# Patient Record
Sex: FEMALE | Race: WHITE | NOT HISPANIC OR LATINO
[De-identification: names, ages, dates, MRNs, and addresses within clinical notes are randomized per-mention and may not be internally consistent; named-entity substitution may affect disease eponyms.]

---

## 2017-02-07 ENCOUNTER — APPOINTMENT (OUTPATIENT)
Dept: ORTHOPEDIC SURGERY | Facility: CLINIC | Age: 62
End: 2017-02-07

## 2017-02-07 VITALS — DIASTOLIC BLOOD PRESSURE: 80 MMHG | SYSTOLIC BLOOD PRESSURE: 132 MMHG | HEART RATE: 74 BPM

## 2017-02-07 DIAGNOSIS — M25.641 STIFFNESS OF RIGHT HAND, NOT ELSEWHERE CLASSIFIED: ICD-10-CM

## 2017-02-07 DIAGNOSIS — M65.331 TRIGGER FINGER, RIGHT MIDDLE FINGER: ICD-10-CM

## 2017-02-07 DIAGNOSIS — M25.642 STIFFNESS OF LEFT HAND, NOT ELSEWHERE CLASSIFIED: ICD-10-CM

## 2017-03-16 ENCOUNTER — APPOINTMENT (OUTPATIENT)
Dept: ORTHOPEDIC SURGERY | Facility: CLINIC | Age: 62
End: 2017-03-16

## 2017-03-16 VITALS
HEART RATE: 82 BPM | DIASTOLIC BLOOD PRESSURE: 72 MMHG | WEIGHT: 139 LBS | BODY MASS INDEX: 25.26 KG/M2 | HEIGHT: 62.2 IN | OXYGEN SATURATION: 99 % | SYSTOLIC BLOOD PRESSURE: 112 MMHG

## 2018-12-03 ENCOUNTER — APPOINTMENT (OUTPATIENT)
Dept: GASTROENTEROLOGY | Facility: CLINIC | Age: 63
End: 2018-12-03

## 2019-04-09 ENCOUNTER — APPOINTMENT (OUTPATIENT)
Dept: ENDOCRINOLOGY | Facility: CLINIC | Age: 64
End: 2019-04-09
Payer: MEDICARE

## 2019-04-09 VITALS
SYSTOLIC BLOOD PRESSURE: 120 MMHG | HEART RATE: 61 BPM | DIASTOLIC BLOOD PRESSURE: 70 MMHG | OXYGEN SATURATION: 98 % | BODY MASS INDEX: 23.62 KG/M2 | WEIGHT: 130 LBS

## 2019-04-09 DIAGNOSIS — M81.0 AGE-RELATED OSTEOPOROSIS W/OUT CURRENT PATHOLOGICAL FRACTURE: ICD-10-CM

## 2019-04-09 PROCEDURE — 99204 OFFICE O/P NEW MOD 45 MIN: CPT

## 2019-04-09 RX ORDER — MULTIVIT-MIN/FOLIC/VIT K/LYCOP 400-300MCG
25 MCG TABLET ORAL
Refills: 0 | Status: ACTIVE | COMMUNITY

## 2019-04-09 RX ORDER — CLONAZEPAM 2 MG/1
TABLET ORAL
Refills: 0 | Status: ACTIVE | COMMUNITY

## 2019-04-09 RX ORDER — ELECTROLYTES/DEXTROSE
SOLUTION, ORAL ORAL
Refills: 0 | Status: COMPLETED | COMMUNITY

## 2019-04-09 NOTE — CONSULT LETTER
[Consult Letter:] : I had the pleasure of evaluating your patient, [unfilled]. [Please see my note below.] : Please see my note below. [Consult Closing:] : Thank you very much for allowing me to participate in the care of this patient.  If you have any questions, please do not hesitate to contact me. [Sincerely,] : Sincerely, [Dear  ___] : Dear  [unfilled], [DrJerri  ___] : Dr. REECE [FreeTextEntry2] : Hamlet Pickett MD\par [address], \[place] [address]

## 2019-04-09 NOTE — ASSESSMENT
[Raloxifene Therapy] : Risks and benefits of Raloxifene therapy were discussed with the patient including blood clots, hot flashes, and cramps [Denosumab Therapy] : Risks  and benefits of denosumab therapy were discussed with the patient including eczema, cellulitis, osteonecrosis of the jaw and atypical femur fractures [Bisphosphonate Therapy] : Risks  and benefits of bisphosphonate therapy were  discussed with the patient including gastroesophageal irritation, osteonecrosis of the jaw, and atypical femur fractures, and acute phase reaction [FreeTextEntry1] : 63 year old female with osteoporosis. \par \par Pt has severe osteoporosis for many years with T score decreased to ~ 4.5. Pt had been on increasing doses of Omeprazole (PPI) for active GERD now on Zantac, though I do not believe this is the root cause of her bone loss. She was only first treated by Dr. Mak in 2017 with 1 year of Forteo. Pt was then transitioned to Atelvia which she did not tolerate due to UGI sx. She then took IV Reclast in Nov 2018. Tolerated well. No APR. No ONJ or thigh pain. No interval fx. Pt gets dietary CA. Pt takes Vit D. Pt taking Vit K. I discussed risk of thrombosis, I generally don't recommend this supplement. Pt exercises regularly and golfs. Do not restrict activity but reviewed proper form to bend and lift. \par \par Risks and benefits discussed to include flu like sx on first dose of Reclast, long term risk of ONJ or atypical femur fx. With Reclast, pt would be candidate for a drug holiday <5 years to decrease the future risk of these side effects. Recent research with Reclast looked at 18 month cycles of dosing instead of 12 months, with 6 year safety data, further there was reduction in CA. At this point, I do not see a true reason to transition to Prolia, despite being the better studied rx after anabolics (DATA switch trial, Gracie et al). All questions answered. \par \par Of note, Fhx of breast CA (mother). Offered Evista. Discussed primary use for breast CA prevention with risk of hot flashes, leg cramps, and ONJ. Denied. \par \par f/u in Nov 2019 to repeat BMD. \par ref: Pharmacological Management of Osteoporosis in Postmenopausal Women: An Endocrine Society  Clinical Practice Guideline. Renee R, Brice CJ., Grant DM, Clifton AM, Reno MH, Deb D. J Clin Endocrinol Metab. 2019 Mar 25. 2019

## 2019-04-09 NOTE — PHYSICAL EXAM
[Alert] : alert [No Acute Distress] : no acute distress [Well Nourished] : well nourished [Well Developed] : well developed [EOMI] : extra ocular movement intact [Normal Sclera/Conjunctiva] : normal sclera/conjunctiva [No Proptosis] : no proptosis [Normal Oropharynx] : the oropharynx was normal [Thyroid Not Enlarged] : the thyroid was not enlarged [No Accessory Muscle Use] : no accessory muscle use [No Respiratory Distress] : no respiratory distress [No Thyroid Nodules] : there were no palpable thyroid nodules [Normal Rate] : heart rate was normal  [Clear to Auscultation] : lungs were clear to auscultation bilaterally [Regular Rhythm] : with a regular rhythm [Normal S1, S2] : normal S1 and S2 [Normal Bowel Sounds] : normal bowel sounds [Not Tender] : non-tender [Soft] : abdomen soft [Not Distended] : not distended [Post Cervical Nodes] : posterior cervical nodes [Normal] : normal and non tender [Anterior Cervical Nodes] : anterior cervical nodes [No Spinal Tenderness] : no spinal tenderness [Spine Straight] : spine straight [No Stigmata of Cushings Syndrome] : no stigmata of cushings syndrome [Normal Gait] : normal gait [No Rash] : no rash [Normal Strength/Tone] : muscle strength and tone were normal [No Tremors] : no tremors [Normal Reflexes] : deep tendon reflexes were 2+ and symmetric [Oriented x3] : oriented to person, place, and time [Acanthosis Nigricans] : no acanthosis nigricans

## 2019-04-09 NOTE — END OF VISIT
[FreeTextEntry3] : I, Jacqui Azevedo, authored this note working as a medical scribe for Dr. Funk.  04/09/2019.  2:15PM. \par This note was authored by Jacqui Azevedo working as medical scribe for me. I have reviewed, edited, and revised the note as needed. I am in agreement with the exam findings, imaging findings, and treatment plan.  Denton Funk MD

## 2019-04-09 NOTE — HISTORY OF PRESENT ILLNESS
[Calcium (dietary)] : calcium from their regular diet [Teriparatide (Forteo)] : Teriparatide [Calcium (supplements)] : calcium from a dietary supplement [Vitamin D (supplements)] : Vitamin D as a dietary supplement [Zoledronic Acid (Zometa)] : Zoledronic Acid [Patient taking Meds Correctly] : Patient is taking meds correctly [Family History of Breast Cancer] : family history of breast cancer [Regular Dental Follow-Up] : regular dental follow-up [Taking Steroids] : no past or present history of taking steroids [Kidney Stones] : no history of kidney stones [High Fall Risk] : no fall risk [Excessive Alcohol Intake] : no past or present history of excessive alcohol intake [Current Smoking___(ppd)] : not currently smoking [Frequent Falls] : no frequent falls [Uses a Walker/Cane] : no use of a walker/cane [Hyperparathyroidism] : no hyperparathyroidism [Family History of Osteoporosis] : no family history of osteoporosis [Family History of Hip Fracture] : no family history of hip fracture [FreeTextEntry1] : Atelvia  [Previous Fragility Fracture] : no previous fragility fracture

## 2019-04-09 NOTE — REVIEW OF SYSTEMS
[Negative] : Heme/Lymph [Heartburn] : heartburn [Anxiety] : anxiety [FreeTextEntry8] : frequent urination

## 2019-10-16 ENCOUNTER — APPOINTMENT (OUTPATIENT)
Dept: ENDOCRINOLOGY | Facility: CLINIC | Age: 64
End: 2019-10-16

## 2019-12-21 ENCOUNTER — TRANSCRIPTION ENCOUNTER (OUTPATIENT)
Age: 64
End: 2019-12-21

## 2020-10-05 ENCOUNTER — NEW PATIENT COMPREHENSIVE (OUTPATIENT)
Dept: URBAN - METROPOLITAN AREA CLINIC 32 | Facility: CLINIC | Age: 65
End: 2020-10-05

## 2020-10-05 DIAGNOSIS — Z96.1: ICD-10-CM

## 2020-10-05 DIAGNOSIS — H04.123: ICD-10-CM

## 2020-10-05 PROCEDURE — 92004 COMPRE OPH EXAM NEW PT 1/>: CPT

## 2020-10-05 PROCEDURE — 92015 DETERMINE REFRACTIVE STATE: CPT

## 2020-10-05 ASSESSMENT — KERATOMETRY
OD_K1POWER_DIOPTERS: 45.25
OD_AXISANGLE2_DEGREES: 50
OD_AXISANGLE_DEGREES: 140
OS_K2POWER_DIOPTERS: 47.25
OS_AXISANGLE2_DEGREES: 79
OD_K2POWER_DIOPTERS: 44.75
OS_K1POWER_DIOPTERS: 48.25
OS_AXISANGLE_DEGREES: 169

## 2020-10-05 ASSESSMENT — VISUAL ACUITY
OD_CC: 20/25-2
OD_CC: J2
OS_GLARE: 20/400
OD_GLARE: 20/400
OS_CC: J3
OS_CC: 20/30

## 2020-10-05 ASSESSMENT — TONOMETRY
OD_IOP_MMHG: 9
OS_IOP_MMHG: 13

## 2020-11-18 ASSESSMENT — KERATOMETRY
OS_AXISANGLE_DEGREES: 169
OS_AXISANGLE2_DEGREES: 79
OD_K1POWER_DIOPTERS: 45.25
OS_K2POWER_DIOPTERS: 47.25
OS_K1POWER_DIOPTERS: 48.25
OD_AXISANGLE2_DEGREES: 50
OD_AXISANGLE_DEGREES: 140
OD_K2POWER_DIOPTERS: 44.75

## 2020-11-19 ENCOUNTER — GLASSES RECHECK (OUTPATIENT)
Dept: URBAN - METROPOLITAN AREA CLINIC 32 | Facility: CLINIC | Age: 65
End: 2020-11-19

## 2020-11-19 DIAGNOSIS — H04.123: ICD-10-CM

## 2020-11-19 PROCEDURE — 92015 DETERMINE REFRACTIVE STATE: CPT

## 2020-11-19 ASSESSMENT — VISUAL ACUITY
OU_CC: J1+
OD_CC: 20/20-1
OS_CC: 20/25

## 2022-01-25 ENCOUNTER — PREPPED CHART (OUTPATIENT)
Dept: URBAN - METROPOLITAN AREA CLINIC 32 | Facility: CLINIC | Age: 67
End: 2022-01-25

## 2022-01-25 ENCOUNTER — COMPREHENSIVE EXAM (OUTPATIENT)
Dept: URBAN - METROPOLITAN AREA CLINIC 32 | Facility: CLINIC | Age: 67
End: 2022-01-25

## 2022-01-25 DIAGNOSIS — H04.123: ICD-10-CM

## 2022-01-25 DIAGNOSIS — Z96.1: ICD-10-CM

## 2022-01-25 DIAGNOSIS — H35.373: ICD-10-CM

## 2022-01-25 DIAGNOSIS — H26.493: ICD-10-CM

## 2022-01-25 PROCEDURE — 92015 DETERMINE REFRACTIVE STATE: CPT

## 2022-01-25 PROCEDURE — 92014 COMPRE OPH EXAM EST PT 1/>: CPT

## 2022-01-25 ASSESSMENT — KERATOMETRY
OS_K2POWER_DIOPTERS: 47.25
OS_K2POWER_DIOPTERS: 45.00
OD_K1POWER_DIOPTERS: 45.25
OS_AXISANGLE2_DEGREES: 25
OD_AXISANGLE2_DEGREES: 35
OD_K2POWER_DIOPTERS: 45.25
OS_AXISANGLE2_DEGREES: 79
OD_K2POWER_DIOPTERS: 44.75
OD_K1POWER_DIOPTERS: 45.75
OS_AXISANGLE_DEGREES: 169
OS_K1POWER_DIOPTERS: 48.25
OD_AXISANGLE_DEGREES: 140
OS_AXISANGLE_DEGREES: 115
OD_AXISANGLE_DEGREES: 125
OS_K1POWER_DIOPTERS: 45.25
OD_AXISANGLE2_DEGREES: 50

## 2022-01-25 ASSESSMENT — VISUAL ACUITY
OS_SC: 20/30-1
OD_CC: 20/30
OS_CC: 20/25
OS_CC: J5-1
OD_CC: J2-1
OD_SC: 20/30
OS_SC: J16
OD_SC: J10

## 2022-01-25 ASSESSMENT — TONOMETRY
OD_IOP_MMHG: 08
OS_IOP_MMHG: 08

## 2022-10-04 ENCOUNTER — OFFICE VISIT (OUTPATIENT)
Dept: URBAN - METROPOLITAN AREA CLINIC 68 | Facility: CLINIC | Age: 67
End: 2022-10-04

## 2022-10-04 RX ORDER — MIRTAZAPINE 7.5 MG/1
1 TABLET AT BEDTIME TABLET, FILM COATED ORAL ONCE A DAY
Qty: 30 TABLET | Refills: 2 | OUTPATIENT

## 2022-10-04 RX ORDER — FAMOTIDINE 10 MG/1
1 TABLET AS NEEDED TABLET, FILM COATED ORAL TWICE A DAY
Status: ACTIVE | COMMUNITY

## 2022-10-04 NOTE — HPI-MIGRATED HPI
Transition to Care : 67 y.o. WF with a history of GERD and functional dyspepsia who is here for management. She did have an EGD last year with Dr. Calderón and will request this for review. She denies any gib, no dysphagia. She has Osteoporosis and was on PPI therapy for over 10 years and is currently taking Pepcid 10mg bid. She does find that there are dietary triggers that lead to increase in reflux. She does use TUMS and did feel that FDgard helped initially. She denies having a hiatal hernia. She is agreeable to try Mirtazapine 7.5mg qhs.

## 2022-10-04 NOTE — EXAM-MIGRATED EXAMINATIONS
General Examination: General appearance: -> alert, pleasant, well-nourished and in no acute distress   Head: -> normocephalic, atraumatic   Eyes: -> normal   Neck / thyroid: -> neck is supple, with full range of motion and no cervical lymphadenopathy   Skin: -> skin is warm and dry, with no rashes, good skin turgor and normal hair distribution   Heart: -> regular rate and rhythm without murmurs, gallops, clicks or rubs   Lungs: -> clear to auscultation bilaterally, with good air movement and no rales, rhonchi or wheezes   Chest: -> chest wall with no costochondral junction tenderness, no rib deformity and normal shape and expansion   Abdomen: -> soft with good bowel sounds, nontender, and no masses or hepatosplenomegaly , negative Yoder's sign   Extremities: -> normal extremity with no clubbing, cyanosis or edema   Neurologic: -> alert and oriented

## 2022-12-05 ENCOUNTER — OFFICE VISIT (OUTPATIENT)
Dept: URBAN - METROPOLITAN AREA CLINIC 68 | Facility: CLINIC | Age: 67
End: 2022-12-05

## 2022-12-05 RX ORDER — FAMOTIDINE 10 MG/1
1 TABLET AS NEEDED TABLET, FILM COATED ORAL TWICE A DAY
Status: ACTIVE | COMMUNITY

## 2022-12-05 RX ORDER — MIRTAZAPINE 7.5 MG/1
1 TABLET AT BEDTIME TABLET, FILM COATED ORAL ONCE A DAY
Qty: 30 TABLET | Refills: 2 | Status: ACTIVE | COMMUNITY

## 2022-12-05 RX ORDER — MIRTAZAPINE 7.5 MG/1
1 TABLET AT BEDTIME TABLET, FILM COATED ORAL ONCE A DAY
Qty: 90 TABLET | Refills: 4 | OUTPATIENT

## 2022-12-05 NOTE — HPI-MIGRATED HPI
Transition to Care : 67 y.o. WF withh Functional dyspepsia who is here for follow up. She has been feeling better since starting Mirtazapine 7.5 mg qhs. She did have an evaluation with an EGD with Dr. Calderón which showed mild gastritis. She denies any gib, no abdominal pain, no n/v.

## 2022-12-05 NOTE — EXAM-MIGRATED EXAMINATIONS
General Examination: Extremities: -> normal extremity with no clubbing, cyanosis or edema   Neurologic: -> alert and oriented   Neck / thyroid: -> neck is supple, with full range of motion and no cervical lymphadenopathy   Skin: -> skin is warm and dry, with no rashes, good skin turgor and normal hair distribution   Heart: -> regular rate and rhythm without murmurs, gallops, clicks or rubs   Lungs: -> clear to auscultation bilaterally, with good air movement and no rales, rhonchi or wheezes   Chest: -> chest wall with no costochondral junction tenderness, no rib deformity and normal shape and expansion   Abdomen: -> soft with good bowel sounds, nontender, and no masses or hepatosplenomegaly , negative Yoder's sign   General appearance: -> alert, pleasant, well-nourished and in no acute distress   Head: -> normocephalic, atraumatic   Eyes: -> normal

## 2023-01-06 ENCOUNTER — TELEPHONE ENCOUNTER (OUTPATIENT)
Dept: URBAN - METROPOLITAN AREA CLINIC 68 | Facility: CLINIC | Age: 68
End: 2023-01-06

## 2023-04-04 ENCOUNTER — COMPREHENSIVE EXAM (OUTPATIENT)
Dept: URBAN - METROPOLITAN AREA CLINIC 32 | Facility: CLINIC | Age: 68
End: 2023-04-04

## 2023-04-04 DIAGNOSIS — Z96.1: ICD-10-CM

## 2023-04-04 DIAGNOSIS — H35.373: ICD-10-CM

## 2023-04-04 DIAGNOSIS — H16.223: ICD-10-CM

## 2023-04-04 PROCEDURE — 92134 CPTRZ OPH DX IMG PST SGM RTA: CPT

## 2023-04-04 PROCEDURE — 92014 COMPRE OPH EXAM EST PT 1/>: CPT

## 2023-04-04 ASSESSMENT — KERATOMETRY
OD_AXISANGLE2_DEGREES: 50
OS_AXISANGLE2_DEGREES: 79
OD_K2POWER_DIOPTERS: 44.75
OD_AXISANGLE_DEGREES: 140
OS_AXISANGLE_DEGREES: 169
OS_K2POWER_DIOPTERS: 47.25
OD_K1POWER_DIOPTERS: 45.25
OS_K1POWER_DIOPTERS: 48.25

## 2023-04-04 ASSESSMENT — VISUAL ACUITY
OS_CC: 20/25-3
OD_CC: 20/20-3

## 2023-04-04 ASSESSMENT — TONOMETRY
OS_IOP_MMHG: 16
OD_IOP_MMHG: 14

## 2023-07-20 ENCOUNTER — LAB OUTSIDE AN ENCOUNTER (OUTPATIENT)
Dept: URBAN - METROPOLITAN AREA CLINIC 68 | Facility: CLINIC | Age: 68
End: 2023-07-20

## 2023-07-20 ENCOUNTER — WEB ENCOUNTER (OUTPATIENT)
Dept: URBAN - METROPOLITAN AREA CLINIC 68 | Facility: CLINIC | Age: 68
End: 2023-07-20

## 2023-07-20 ENCOUNTER — OFFICE VISIT (OUTPATIENT)
Dept: URBAN - METROPOLITAN AREA CLINIC 68 | Facility: CLINIC | Age: 68
End: 2023-07-20
Payer: MEDICARE

## 2023-07-20 VITALS
BODY MASS INDEX: 22.15 KG/M2 | HEIGHT: 63 IN | WEIGHT: 125 LBS | DIASTOLIC BLOOD PRESSURE: 72 MMHG | SYSTOLIC BLOOD PRESSURE: 126 MMHG

## 2023-07-20 DIAGNOSIS — R11.0 NAUSEA: ICD-10-CM

## 2023-07-20 DIAGNOSIS — K21.9 GERD: ICD-10-CM

## 2023-07-20 DIAGNOSIS — R19.4 CHANGE IN BOWEL HABITS: ICD-10-CM

## 2023-07-20 PROBLEM — 88111009: Status: ACTIVE | Noted: 2023-07-20

## 2023-07-20 PROCEDURE — 99214 OFFICE O/P EST MOD 30 MIN: CPT

## 2023-07-20 RX ORDER — MIRTAZAPINE 7.5 MG/1
1 TABLET AT BEDTIME TABLET, FILM COATED ORAL ONCE A DAY
Qty: 90 TABLET | Refills: 4 | Status: ON HOLD | COMMUNITY

## 2023-07-20 RX ORDER — ONDANSETRON 4 MG/1
1 TABLET ON THE TONGUE AND ALLOW TO DISSOLVE TABLET, ORALLY DISINTEGRATING ORAL
Qty: 15 | Refills: 0 | OUTPATIENT
Start: 2023-07-20

## 2023-07-20 RX ORDER — MIRTAZAPINE 7.5 MG/1
1 TABLET AT BEDTIME TABLET, FILM COATED ORAL ONCE A DAY
Qty: 30 TABLET | Refills: 2 | Status: ON HOLD | COMMUNITY

## 2023-07-20 RX ORDER — SODIUM PICOSULFATE, MAGNESIUM OXIDE, AND ANHYDROUS CITRIC ACID 10; 3.5; 12 MG/160ML; G/160ML; G/160ML
160 ML LIQUID ORAL
Qty: 320 MILLILITER | Refills: 0 | OUTPATIENT
Start: 2023-07-20 | End: 2023-07-21

## 2023-07-20 RX ORDER — FAMOTIDINE 10 MG/1
1 TABLET AS NEEDED TABLET, FILM COATED ORAL TWICE A DAY
Status: ON HOLD | COMMUNITY

## 2023-07-20 NOTE — HPI-MIGRATED HPI
67 y/o F with history of GERD (EGD 2021 by Dr. Calderón), functional dyspepsia, and osteoporosis (s/p 10 yrs of PPI use), and a colonoscopy approx 5 yrs ago (will request records). Pt is presenting for significant nausea, which occurs in the mornings. She notes nausea does improve after eating her morning meal, but she persistently wakes up to this nausea which has become frustrating for her. She has taken famotidine in the past. She no longer will take famotidine or PPIs and prefers a holistic approach, although, symptoms of nausea were previously controlled with anti-reflux measures. She notes nausea has worsened since her brother was diagnosed with metastatic cancer, and report her anxiety has been increased by this and is currently on Lexapro. Pt additionally complains of changing bowel movements over the past 6 months and states she sometimes has pellet-like stool with alternating pencil-thin stools. She notes stool consistency varies from "hard" to soft, and is more often "hard". She also notes she does struggle to remember things and think she may have some signs of dementia. She reports her holistic PCP did order "some stool test" but is unsure if it was a cologuard test. Medical records are requested today. Discussed colonoscopy with patient and prn use of zofran to control nausea. She is advised to trial a course of famotine and declines this option. Additionally, recommended optimizing anti-reflux measures by adhering to an anti-reflux diet or OTC tums. Pt can also reach out to her (holistic) PCP to more "natural" options with her holistic PCP.  Patient denies vomiting, dysphagia, odynophagia, heartburn, abdominal pain, fever, chills, bone pain, or unintentional weight loss.

## 2023-07-21 ENCOUNTER — ERX REFILL RESPONSE (OUTPATIENT)
Dept: URBAN - METROPOLITAN AREA CLINIC 68 | Facility: CLINIC | Age: 68
End: 2023-07-21

## 2023-07-21 RX ORDER — SODIUM, POTASSIUM,MAG SULFATES 17.5-3.13G
17.5-3.13-1.6 GM/177 ML SOLUTION, RECONSTITUTED, ORAL ORAL
Qty: 1 KIT | Refills: 0 | OUTPATIENT

## 2023-07-21 RX ORDER — SODIUM PICOSULFATE, MAGNESIUM OXIDE, AND ANHYDROUS CITRIC ACID 10; 3.5; 12 MG/160ML; G/160ML; G/160ML
160 ML LIQUID ORAL
Qty: 320 MILLILITER | Refills: 0 | OUTPATIENT

## 2023-08-25 ENCOUNTER — OFFICE VISIT (OUTPATIENT)
Dept: URBAN - METROPOLITAN AREA CLINIC 68 | Facility: CLINIC | Age: 68
End: 2023-08-25

## 2023-09-26 ENCOUNTER — OUT OF OFFICE VISIT (OUTPATIENT)
Dept: URBAN - METROPOLITAN AREA SURGERY CENTER 12 | Facility: SURGERY CENTER | Age: 68
End: 2023-09-26
Payer: MEDICARE

## 2023-09-26 ENCOUNTER — OFFICE VISIT (OUTPATIENT)
Dept: URBAN - METROPOLITAN AREA SURGERY CENTER 12 | Facility: SURGERY CENTER | Age: 68
End: 2023-09-26

## 2023-09-26 DIAGNOSIS — K64.8 OTHER HEMORRHOIDS: ICD-10-CM

## 2023-09-26 DIAGNOSIS — K64.4 RESIDUAL HEMORRHOIDAL SKIN TAGS: ICD-10-CM

## 2023-09-26 DIAGNOSIS — K64.1 SECOND DEGREE HEMORRHOIDS: ICD-10-CM

## 2023-09-26 DIAGNOSIS — K57.30 DIVERTICULOSIS OF LARGE INTESTINE WITHOUT PERFORATION OR ABSCESS WITHOUT BLEEDING: ICD-10-CM

## 2023-09-26 DIAGNOSIS — R19.4 CHANGE IN BOWEL HABITS: ICD-10-CM

## 2023-09-26 PROCEDURE — 45378 DIAGNOSTIC COLONOSCOPY: CPT | Performed by: INTERNAL MEDICINE

## 2023-09-26 PROCEDURE — 00811 ANES LWR INTST NDSC NOS: CPT | Performed by: NURSE ANESTHETIST, CERTIFIED REGISTERED

## 2023-09-26 PROCEDURE — 45378 DIAGNOSTIC COLONOSCOPY: CPT | Performed by: CLINIC/CENTER

## 2023-09-26 RX ORDER — SODIUM, POTASSIUM,MAG SULFATES 17.5-3.13G
17.5-3.13-1.6 GM/177 ML SOLUTION, RECONSTITUTED, ORAL ORAL
Qty: 1 KIT | Refills: 0 | Status: ACTIVE | COMMUNITY

## 2023-09-26 RX ORDER — ONDANSETRON 4 MG/1
1 TABLET ON THE TONGUE AND ALLOW TO DISSOLVE TABLET, ORALLY DISINTEGRATING ORAL
Qty: 15 | Refills: 0 | Status: ACTIVE | COMMUNITY
Start: 2023-07-20

## 2023-09-26 RX ORDER — FAMOTIDINE 10 MG/1
1 TABLET AS NEEDED TABLET, FILM COATED ORAL TWICE A DAY
Status: ON HOLD | COMMUNITY

## 2023-09-26 RX ORDER — MIRTAZAPINE 7.5 MG/1
1 TABLET AT BEDTIME TABLET, FILM COATED ORAL ONCE A DAY
Qty: 30 TABLET | Refills: 2 | Status: ON HOLD | COMMUNITY

## 2023-09-26 RX ORDER — MIRTAZAPINE 7.5 MG/1
1 TABLET AT BEDTIME TABLET, FILM COATED ORAL ONCE A DAY
Qty: 90 TABLET | Refills: 4 | Status: ON HOLD | COMMUNITY

## 2023-09-27 ENCOUNTER — TELEPHONE ENCOUNTER (OUTPATIENT)
Dept: URBAN - METROPOLITAN AREA CLINIC 23 | Facility: CLINIC | Age: 68
End: 2023-09-27

## 2024-01-02 ENCOUNTER — OFFICE VISIT (OUTPATIENT)
Dept: URBAN - METROPOLITAN AREA CLINIC 68 | Facility: CLINIC | Age: 69
End: 2024-01-02

## 2024-01-12 ENCOUNTER — LAB OUTSIDE AN ENCOUNTER (OUTPATIENT)
Dept: URBAN - METROPOLITAN AREA TELEHEALTH 1 | Facility: TELEHEALTH | Age: 69
End: 2024-01-12

## 2024-01-12 ENCOUNTER — OFFICE VISIT (OUTPATIENT)
Dept: URBAN - METROPOLITAN AREA TELEHEALTH 1 | Facility: TELEHEALTH | Age: 69
End: 2024-01-12
Payer: MEDICARE

## 2024-01-12 ENCOUNTER — TELEPHONE ENCOUNTER (OUTPATIENT)
Dept: URBAN - METROPOLITAN AREA CLINIC 68 | Facility: CLINIC | Age: 69
End: 2024-01-12

## 2024-01-12 DIAGNOSIS — R10.10 UPPER ABDOMINAL PAIN: ICD-10-CM

## 2024-01-12 DIAGNOSIS — K57.30 DIVERTICULOSIS OF LARGE INTESTINE WITHOUT PERFORATION OR ABSCESS WITHOUT BLEEDING: ICD-10-CM

## 2024-01-12 DIAGNOSIS — R11.0 NAUSEA: ICD-10-CM

## 2024-01-12 PROBLEM — 83132003: Status: ACTIVE | Noted: 2024-01-12

## 2024-01-12 PROBLEM — 724538004: Status: ACTIVE | Noted: 2023-09-27

## 2024-01-12 PROBLEM — 31704005: Status: ACTIVE | Noted: 2024-01-12

## 2024-01-12 PROBLEM — 422587007: Status: ACTIVE | Noted: 2023-07-20

## 2024-01-12 PROBLEM — 721704005: Status: ACTIVE | Noted: 2024-01-12

## 2024-01-12 PROCEDURE — 99214 OFFICE O/P EST MOD 30 MIN: CPT

## 2024-01-12 RX ORDER — FAMOTIDINE 10 MG/1
1 TABLET AS NEEDED TABLET, FILM COATED ORAL TWICE A DAY
Status: ON HOLD | COMMUNITY

## 2024-01-12 RX ORDER — MIRTAZAPINE 7.5 MG/1
1 TABLET AT BEDTIME TABLET, FILM COATED ORAL ONCE A DAY
Qty: 30 TABLET | Refills: 2 | Status: ON HOLD | COMMUNITY

## 2024-01-12 RX ORDER — ONDANSETRON 4 MG/1
1 TABLET ON THE TONGUE AND ALLOW TO DISSOLVE TABLET, ORALLY DISINTEGRATING ORAL
Qty: 15 | Refills: 0 | Status: ACTIVE | COMMUNITY
Start: 2023-07-20

## 2024-01-12 RX ORDER — MIRTAZAPINE 7.5 MG/1
1 TABLET AT BEDTIME TABLET, FILM COATED ORAL ONCE A DAY
Qty: 90 TABLET | Refills: 4 | Status: ON HOLD | COMMUNITY

## 2024-01-12 RX ORDER — SODIUM, POTASSIUM,MAG SULFATES 17.5-3.13G
17.5-3.13-1.6 GM/177 ML SOLUTION, RECONSTITUTED, ORAL ORAL
Qty: 1 KIT | Refills: 0 | Status: ACTIVE | COMMUNITY

## 2024-01-12 RX ORDER — PANTOPRAZOLE SODIUM 40 MG/1
1 TABLET 30 MINUTES BEFORE MORNING MEAL TABLET, DELAYED RELEASE ORAL ONCE A DAY
Qty: 30 | Refills: 3 | OUTPATIENT
Start: 2024-01-12

## 2024-01-12 NOTE — HPI-TODAY'S VISIT:
67 y/o F with history of GERD (EGD 2021 by Dr. Calderón), functional dyspepsia, and osteoporosis (s/p 10 yrs of PPI use), and most recent colonoscopy with diverticulosis (9/2023) with Dr. Aponte. Pt is presenting for evaluation of new upper abdominal pain in the presence of accompanying persistent significant nausea (x 8 mo). She has a hx of osteoporosis and no longer uses PPIs and prefers a holistic approach, although, symptoms of nausea were previously controlled with anti-reflux measures. Discussed EGD for further evaluation and r/o of possible PUD, H. pylori, gastritis, and upper GI malignancy. Can start short/limited course PPI as this has relieved symptoms in past. Patient denies fever, chills, vomiting, coffee ground emesis, dysphagia, odynophagia, heartburn, melena, hematochezia, or unintentional weight loss.

## 2024-01-25 ENCOUNTER — TELEPHONE ENCOUNTER (OUTPATIENT)
Dept: URBAN - METROPOLITAN AREA CLINIC 68 | Facility: CLINIC | Age: 69
End: 2024-01-25

## 2024-02-05 ENCOUNTER — OV EP (OUTPATIENT)
Dept: URBAN - METROPOLITAN AREA CLINIC 68 | Facility: CLINIC | Age: 69
End: 2024-02-05

## 2024-02-06 ENCOUNTER — LAB (OUTPATIENT)
Dept: URBAN - METROPOLITAN AREA CLINIC 4 | Facility: CLINIC | Age: 69
End: 2024-02-06
Payer: MEDICARE

## 2024-02-06 ENCOUNTER — EGD (OUTPATIENT)
Dept: URBAN - METROPOLITAN AREA SURGERY CENTER 12 | Facility: SURGERY CENTER | Age: 69
End: 2024-02-06
Payer: MEDICARE

## 2024-02-06 DIAGNOSIS — K31.89 OTHER DISEASES OF STOMACH AND DUODENUM: ICD-10-CM

## 2024-02-06 DIAGNOSIS — K44.9 DIAPHRAGMATIC HERNIA WITHOUT OBSTRUCTION OR GANGRENE: ICD-10-CM

## 2024-02-06 PROCEDURE — 88312 SPECIAL STAINS GROUP 1: CPT | Performed by: PATHOLOGY

## 2024-02-06 PROCEDURE — 88305 TISSUE EXAM BY PATHOLOGIST: CPT | Performed by: PATHOLOGY

## 2024-02-06 PROCEDURE — 43239 EGD BIOPSY SINGLE/MULTIPLE: CPT | Performed by: INTERNAL MEDICINE

## 2024-02-06 RX ORDER — PANTOPRAZOLE SODIUM 40 MG/1
1 TABLET 30 MINUTES BEFORE MORNING MEAL TABLET, DELAYED RELEASE ORAL ONCE A DAY
Qty: 30 | Refills: 3 | Status: ACTIVE | COMMUNITY
Start: 2024-01-12

## 2024-02-06 RX ORDER — FAMOTIDINE 10 MG/1
1 TABLET AS NEEDED TABLET, FILM COATED ORAL TWICE A DAY
Status: ON HOLD | COMMUNITY

## 2024-02-06 RX ORDER — MIRTAZAPINE 7.5 MG/1
1 TABLET AT BEDTIME TABLET, FILM COATED ORAL ONCE A DAY
Qty: 90 TABLET | Refills: 4 | Status: ON HOLD | COMMUNITY

## 2024-02-06 RX ORDER — SODIUM, POTASSIUM,MAG SULFATES 17.5-3.13G
17.5-3.13-1.6 GM/177 ML SOLUTION, RECONSTITUTED, ORAL ORAL
Qty: 1 KIT | Refills: 0 | Status: ACTIVE | COMMUNITY

## 2024-02-06 RX ORDER — ONDANSETRON 4 MG/1
1 TABLET ON THE TONGUE AND ALLOW TO DISSOLVE TABLET, ORALLY DISINTEGRATING ORAL
Qty: 15 | Refills: 0 | Status: ACTIVE | COMMUNITY
Start: 2023-07-20

## 2024-02-06 RX ORDER — MIRTAZAPINE 7.5 MG/1
1 TABLET AT BEDTIME TABLET, FILM COATED ORAL ONCE A DAY
Qty: 30 TABLET | Refills: 2 | Status: ON HOLD | COMMUNITY

## 2024-02-07 ENCOUNTER — OV EP (OUTPATIENT)
Dept: URBAN - METROPOLITAN AREA CLINIC 68 | Facility: CLINIC | Age: 69
End: 2024-02-07

## 2024-03-05 ENCOUNTER — OV EP (OUTPATIENT)
Dept: URBAN - METROPOLITAN AREA CLINIC 68 | Facility: CLINIC | Age: 69
End: 2024-03-05
Payer: MEDICARE

## 2024-03-05 ENCOUNTER — LAB (OUTPATIENT)
Dept: URBAN - METROPOLITAN AREA CLINIC 68 | Facility: CLINIC | Age: 69
End: 2024-03-05

## 2024-03-05 VITALS
HEIGHT: 63 IN | HEART RATE: 66 BPM | DIASTOLIC BLOOD PRESSURE: 82 MMHG | BODY MASS INDEX: 22.86 KG/M2 | OXYGEN SATURATION: 99 % | WEIGHT: 129 LBS | SYSTOLIC BLOOD PRESSURE: 138 MMHG

## 2024-03-05 DIAGNOSIS — K30 FUNCTIONAL DYSPEPSIA: ICD-10-CM

## 2024-03-05 DIAGNOSIS — K44.9 HIATAL HERNIA: ICD-10-CM

## 2024-03-05 DIAGNOSIS — R11.0 NAUSEA: ICD-10-CM

## 2024-03-05 DIAGNOSIS — K21.9 GASTROESOPHAGEAL REFLUX DISEASE WITHOUT ESOPHAGITIS: ICD-10-CM

## 2024-03-05 PROBLEM — 84089009: Status: ACTIVE | Noted: 2024-03-05

## 2024-03-05 PROBLEM — 266435005: Status: ACTIVE | Noted: 2024-03-05

## 2024-03-05 PROCEDURE — 99214 OFFICE O/P EST MOD 30 MIN: CPT | Performed by: INTERNAL MEDICINE

## 2024-03-05 RX ORDER — PANTOPRAZOLE SODIUM 40 MG/1
1 TABLET 30 MINUTES BEFORE MORNING MEAL TABLET, DELAYED RELEASE ORAL ONCE A DAY
Qty: 30 | Refills: 3 | Status: DISCONTINUED | COMMUNITY
Start: 2024-01-12

## 2024-03-05 RX ORDER — SODIUM, POTASSIUM,MAG SULFATES 17.5-3.13G
17.5-3.13-1.6 GM/177 ML SOLUTION, RECONSTITUTED, ORAL ORAL
Qty: 1 KIT | Refills: 0 | Status: DISCONTINUED | COMMUNITY

## 2024-03-05 RX ORDER — ONDANSETRON 4 MG/1
1 TABLET ON THE TONGUE AND ALLOW TO DISSOLVE TABLET, ORALLY DISINTEGRATING ORAL
Qty: 15 | Refills: 0 | Status: DISCONTINUED | COMMUNITY
Start: 2023-07-20

## 2024-03-05 RX ORDER — MIRTAZAPINE 7.5 MG/1
1 TABLET AT BEDTIME TABLET, FILM COATED ORAL ONCE A DAY
Qty: 90 TABLET | Refills: 4 | Status: DISCONTINUED | COMMUNITY

## 2024-03-05 RX ORDER — MIRTAZAPINE 7.5 MG/1
1 TABLET AT BEDTIME TABLET, FILM COATED ORAL ONCE A DAY
Qty: 30 TABLET | Refills: 2 | Status: DISCONTINUED | COMMUNITY

## 2024-03-05 RX ORDER — FAMOTIDINE 10 MG/1
1 TABLET AS NEEDED TABLET, FILM COATED ORAL TWICE A DAY
Status: ACTIVE | COMMUNITY

## 2024-03-05 NOTE — HPI-TODAY'S VISIT:
69 y/o F with history of GERD (EGD 2021 by Dr. Calderón), functional dyspepsia, and osteoporosis (s/p 10 yrs of PPI use), and most recent colonoscopy with diverticulosis (9/2023) with Dr. Aponte. Pt is presenting for evaluation of new upper abdominal pain in the presence of accompanying persistent significant nausea (x 8 mo). She has a hx of osteoporosis and no longer uses PPIs and prefers a holistic approach, although, symptoms of nausea were previously controlled with anti-reflux measures. Discussed EGD for further evaluation and r/o of possible PUD, H. pylori, gastritis, and upper GI malignancy. Can start short/limited course PPI as this has relieved symptoms in past. Patient denies fever, chills, vomiting, coffee ground emesis, dysphagia, odynophagia, heartburn, melena, hematochezia, or unintentional weight loss. She is recommended to try Iberogast and will evaluate with a CT scan of abdomen/pelvis. She is undergoing evaluation of memory at [facility]. Continue prehospital Atarax 25 mg p o  every 6 hours as needed, Valium 5 mg p o  twice daily, Pristiq 100 mg p o  daily and Abilify 10 mg p o  daily

## 2024-04-09 ENCOUNTER — OV EP (OUTPATIENT)
Dept: URBAN - METROPOLITAN AREA CLINIC 68 | Facility: CLINIC | Age: 69
End: 2024-04-09
Payer: MEDICARE

## 2024-04-09 VITALS
DIASTOLIC BLOOD PRESSURE: 80 MMHG | SYSTOLIC BLOOD PRESSURE: 130 MMHG | HEART RATE: 66 BPM | HEIGHT: 63 IN | TEMPERATURE: 97.7 F | OXYGEN SATURATION: 98 % | WEIGHT: 128 LBS | BODY MASS INDEX: 22.68 KG/M2

## 2024-04-09 DIAGNOSIS — K21.9 GASTROESOPHAGEAL REFLUX DISEASE WITHOUT ESOPHAGITIS: ICD-10-CM

## 2024-04-09 DIAGNOSIS — K30 FUNCTIONAL DYSPEPSIA: ICD-10-CM

## 2024-04-09 DIAGNOSIS — R11.0 NAUSEA: ICD-10-CM

## 2024-04-09 PROCEDURE — 99214 OFFICE O/P EST MOD 30 MIN: CPT

## 2024-04-09 RX ORDER — FAMOTIDINE 40 MG/1
1 TABLET AT BEDTIME TABLET, FILM COATED ORAL ONCE A DAY
Qty: 90 | Refills: 1 | OUTPATIENT
Start: 2024-04-09

## 2024-04-09 RX ORDER — FAMOTIDINE 10 MG/1
1 TABLET AS NEEDED TABLET, FILM COATED ORAL TWICE A DAY
Status: ACTIVE | COMMUNITY

## 2024-04-09 NOTE — HPI-TODAY'S VISIT:
67 y/o F with history of GERD (EGD 2021 by Dr. Calderón), functional dyspepsia, and osteoporosis (s/p 10 yrs of PPI use), and most recent colonoscopy with diverticulosis (9/2023) with Dr. Aponte. Pt is presenting for follow up of dyspepsia. She reports she did have recent EGD and CT A/P and has been recommended for short course PPI in past, although she deferred. She states she has been on Klonopin in the past which she feels controlled her sx, but has been unable to have her PCP prescribe. She notes not significant improvement with Iberogast of FDgard. She states she has trialed SSRI therapy in past w/o improvement of sx and otherwise defers recommended trial TCA therapy. Patient denies fever, chills, vomiting, coffee ground emesis, dysphagia, odynophagia, heartburn, melena, hematochezia, or unintentional weight loss.

## 2025-07-07 ENCOUNTER — OFFICE VISIT (OUTPATIENT)
Dept: URBAN - METROPOLITAN AREA CLINIC 68 | Facility: CLINIC | Age: 70
End: 2025-07-07
Payer: MEDICARE

## 2025-07-07 ENCOUNTER — DASHBOARD ENCOUNTERS (OUTPATIENT)
Age: 70
End: 2025-07-07

## 2025-07-07 DIAGNOSIS — K30 FUNCTIONAL DYSPEPSIA: ICD-10-CM

## 2025-07-07 PROCEDURE — 99213 OFFICE O/P EST LOW 20 MIN: CPT | Performed by: INTERNAL MEDICINE

## 2025-07-07 RX ORDER — FAMOTIDINE 10 MG/1
1 TABLET AS NEEDED TABLET, FILM COATED ORAL TWICE A DAY
Status: ACTIVE | COMMUNITY

## 2025-07-07 RX ORDER — FAMOTIDINE 40 MG/1
1 TABLET AT BEDTIME TABLET, FILM COATED ORAL ONCE A DAY
Qty: 90 | Refills: 1 | Status: ACTIVE | COMMUNITY
Start: 2024-04-09

## 2025-07-07 NOTE — HPI-TODAY'S VISIT:
69 y/o F with history of GERD, functional dyspepsia, and  colonoscopy with diverticulosis and IH in 2023 who is here for follow up.  She was previously managed on PPI therapy which did help her symptoms; however, she stopped due to concern for longterm associations. She is recommended to take H2 blocker prn. She did try FDgard and Iberogast w/o any relief. She is recommended to try cardiovascular exercise to help with symptoms and anti-reflux measures is encouraged.   She states she has trialed SSRI therapy in past w/o improvement of sx. She has tried Gaviscon, Gax-X w/o much relief.  She did have a CT scan of abdomen/pelvis in 2024.

## 2025-07-16 ENCOUNTER — OFFICE VISIT (OUTPATIENT)
Dept: URBAN - METROPOLITAN AREA CLINIC 68 | Facility: CLINIC | Age: 70
End: 2025-07-16